# Patient Record
Sex: FEMALE | Race: OTHER | NOT HISPANIC OR LATINO | URBAN - METROPOLITAN AREA
[De-identification: names, ages, dates, MRNs, and addresses within clinical notes are randomized per-mention and may not be internally consistent; named-entity substitution may affect disease eponyms.]

---

## 2018-06-20 ENCOUNTER — EMERGENCY (EMERGENCY)
Facility: HOSPITAL | Age: 78
LOS: 1 days | Discharge: HOME | End: 2018-06-20
Attending: EMERGENCY MEDICINE
Payer: MEDICARE

## 2018-06-20 VITALS
TEMPERATURE: 98 F | OXYGEN SATURATION: 98 % | DIASTOLIC BLOOD PRESSURE: 81 MMHG | SYSTOLIC BLOOD PRESSURE: 153 MMHG | RESPIRATION RATE: 16 BRPM | HEART RATE: 60 BPM

## 2018-06-20 PROCEDURE — 99284 EMERGENCY DEPT VISIT MOD MDM: CPT | Mod: 25

## 2018-06-20 PROCEDURE — 73564 X-RAY EXAM KNEE 4 OR MORE: CPT

## 2018-06-20 PROCEDURE — 70450 CT HEAD/BRAIN W/O DYE: CPT | Mod: 26

## 2018-06-20 PROCEDURE — 72125 CT NECK SPINE W/O DYE: CPT

## 2018-06-20 PROCEDURE — 73564 X-RAY EXAM KNEE 4 OR MORE: CPT | Mod: 26,50

## 2018-06-20 PROCEDURE — 72125 CT NECK SPINE W/O DYE: CPT | Mod: 26

## 2018-06-20 PROCEDURE — 99284 EMERGENCY DEPT VISIT MOD MDM: CPT

## 2018-06-20 PROCEDURE — 70450 CT HEAD/BRAIN W/O DYE: CPT

## 2018-06-20 RX ORDER — ACETAMINOPHEN 500 MG
650 TABLET ORAL ONCE
Qty: 0 | Refills: 0 | Status: COMPLETED | OUTPATIENT
Start: 2018-06-20 | End: 2018-06-20

## 2018-06-20 RX ORDER — BACITRACIN ZINC 500 UNIT/G
1 OINTMENT IN PACKET (EA) TOPICAL ONCE
Qty: 0 | Refills: 0 | Status: COMPLETED | OUTPATIENT
Start: 2018-06-20 | End: 2018-06-20

## 2018-06-20 RX ADMIN — Medication 650 MILLIGRAM(S): at 20:49

## 2018-06-20 RX ADMIN — Medication 1 APPLICATION(S): at 20:49

## 2018-06-20 RX ADMIN — Medication 650 MILLIGRAM(S): at 19:09

## 2018-06-20 NOTE — ED PROVIDER NOTE - PHYSICAL EXAMINATION
Neck supple, full range of motion. No spinal/paraspinal tenderness to palpation. Bilateral shoulder, elbows, wrists, hips and ankles full range of motion without swelling or tenderness. Bilateral knees full range of motion with anterior knee tenderness, swelling and slight abrasions. R periorbital slight bruising without swelling or tenderness.

## 2018-06-20 NOTE — ED PROVIDER NOTE - OBJECTIVE STATEMENT
77 year-old female, history of HTN, HLD, DM, CAD, cardiac stents x 3 on Aspirin, presents for eval s/p fall today. While pushing shopping cart tripped and fell forward, landed on both knee and then onto right sided striking face on the ground. Denies LOC. Now c/o mild right sided facial pain and bilateral knee pain. Denies headache, visual changes, N/V, dizziness, or any other complaints.

## 2018-06-20 NOTE — ED PROVIDER NOTE - PROGRESS NOTE DETAILS
Feeling better. CT and xrays negative for acute findings. Bacitracin and ACE wraps applied to both knees. Will dc with PMD follow up in 2-3 days. Pt is well appearing walking with steady gait, stable for discharge and follow up without fail with medical doctor. I had a detailed discussion with the patient and/or guardian regarding the historical points, exam findings, and any diagnostic results supporting the discharge diagnosis. Pt educated on care and need for follow up. Strict return instructions and red flag signs and symptoms discussed with patient. Questions answered. Pt shows understanding of discharge information and agrees to follow.

## 2018-06-20 NOTE — ED PROVIDER NOTE - CARE PLAN
Principal Discharge DX:	Injury of head, initial encounter  Secondary Diagnosis:	Contusion of right knee, initial encounter

## 2018-06-20 NOTE — ED ADULT NURSE NOTE - OBJECTIVE STATEMENT
Pt AOx3, ambulatory, c/o BL knees pain and right sided facial pain, s/p trip/fall. PT denies LOC, syncope, dizziness, n/v., or HA. No confusion noted

## 2018-06-20 NOTE — ED PROVIDER NOTE - MEDICAL DECISION MAKING DETAILS
77 year-old female, presents for eval s/p mechanical fall earlier today. Well-appearing, ambulatory, no focal neuro deficits. Plan: xray, CT, Tylenol, re-assess.

## 2018-06-20 NOTE — ED PROVIDER NOTE - ATTENDING CONTRIBUTION TO CARE
s/o trip and fall landed on right sided striking face on sidewalk , no LOC    c/o right facial pain and right knee pain   knee with swelling and abrasions / FROM    CT and xrays negative

## 2018-06-21 PROBLEM — Z00.00 ENCOUNTER FOR PREVENTIVE HEALTH EXAMINATION: Status: ACTIVE | Noted: 2018-06-21

## 2022-06-13 ENCOUNTER — INPATIENT (INPATIENT)
Facility: HOSPITAL | Age: 82
LOS: 1 days | Discharge: HOME | DRG: 312 | End: 2022-06-15
Attending: INTERNAL MEDICINE | Admitting: INTERNAL MEDICINE
Payer: MEDICARE

## 2022-06-13 VITALS
OXYGEN SATURATION: 97 % | TEMPERATURE: 98 F | SYSTOLIC BLOOD PRESSURE: 165 MMHG | DIASTOLIC BLOOD PRESSURE: 72 MMHG | RESPIRATION RATE: 20 BRPM | HEIGHT: 64 IN | WEIGHT: 136.91 LBS | HEART RATE: 53 BPM

## 2022-06-13 DIAGNOSIS — R00.1 BRADYCARDIA, UNSPECIFIED: ICD-10-CM

## 2022-06-13 PROBLEM — E78.5 HYPERLIPIDEMIA, UNSPECIFIED: Chronic | Status: ACTIVE | Noted: 2018-06-20

## 2022-06-13 PROBLEM — I10 ESSENTIAL (PRIMARY) HYPERTENSION: Chronic | Status: ACTIVE | Noted: 2018-06-20

## 2022-06-13 PROBLEM — I25.10 ATHEROSCLEROTIC HEART DISEASE OF NATIVE CORONARY ARTERY WITHOUT ANGINA PECTORIS: Chronic | Status: ACTIVE | Noted: 2018-06-20

## 2022-06-13 PROBLEM — E11.9 TYPE 2 DIABETES MELLITUS WITHOUT COMPLICATIONS: Chronic | Status: ACTIVE | Noted: 2018-06-20

## 2022-06-13 LAB
ALBUMIN SERPL ELPH-MCNC: 4.1 G/DL — SIGNIFICANT CHANGE UP (ref 3.3–5)
ALP SERPL-CCNC: 55 U/L — SIGNIFICANT CHANGE UP (ref 40–120)
ALT FLD-CCNC: 9 U/L — LOW (ref 10–45)
ANION GAP SERPL CALC-SCNC: 12 MMOL/L — SIGNIFICANT CHANGE UP (ref 5–17)
AST SERPL-CCNC: 12 U/L — SIGNIFICANT CHANGE UP (ref 10–40)
BASOPHILS # BLD AUTO: 0.04 K/UL — SIGNIFICANT CHANGE UP (ref 0–0.2)
BASOPHILS NFR BLD AUTO: 0.7 % — SIGNIFICANT CHANGE UP (ref 0–2)
BILIRUB SERPL-MCNC: 0.4 MG/DL — SIGNIFICANT CHANGE UP (ref 0.2–1.2)
BUN SERPL-MCNC: 22 MG/DL — SIGNIFICANT CHANGE UP (ref 7–23)
CALCIUM SERPL-MCNC: 10.4 MG/DL — SIGNIFICANT CHANGE UP (ref 8.4–10.5)
CHLORIDE SERPL-SCNC: 104 MMOL/L — SIGNIFICANT CHANGE UP (ref 96–108)
CO2 SERPL-SCNC: 25 MMOL/L — SIGNIFICANT CHANGE UP (ref 22–31)
CREAT SERPL-MCNC: 0.81 MG/DL — SIGNIFICANT CHANGE UP (ref 0.5–1.3)
EGFR: 73 ML/MIN/1.73M2 — SIGNIFICANT CHANGE UP
EOSINOPHIL # BLD AUTO: 0.1 K/UL — SIGNIFICANT CHANGE UP (ref 0–0.5)
EOSINOPHIL NFR BLD AUTO: 1.8 % — SIGNIFICANT CHANGE UP (ref 0–6)
GLUCOSE BLDC GLUCOMTR-MCNC: 124 MG/DL — HIGH (ref 70–99)
GLUCOSE BLDC GLUCOMTR-MCNC: 179 MG/DL — HIGH (ref 70–99)
GLUCOSE SERPL-MCNC: 191 MG/DL — HIGH (ref 70–99)
HCT VFR BLD CALC: 39.1 % — SIGNIFICANT CHANGE UP (ref 34.5–45)
HGB BLD-MCNC: 12.5 G/DL — SIGNIFICANT CHANGE UP (ref 11.5–15.5)
IMM GRANULOCYTES NFR BLD AUTO: 0.2 % — SIGNIFICANT CHANGE UP (ref 0–1.5)
LYMPHOCYTES # BLD AUTO: 1.64 K/UL — SIGNIFICANT CHANGE UP (ref 1–3.3)
LYMPHOCYTES # BLD AUTO: 29.6 % — SIGNIFICANT CHANGE UP (ref 13–44)
MCHC RBC-ENTMCNC: 29.1 PG — SIGNIFICANT CHANGE UP (ref 27–34)
MCHC RBC-ENTMCNC: 32 GM/DL — SIGNIFICANT CHANGE UP (ref 32–36)
MCV RBC AUTO: 90.9 FL — SIGNIFICANT CHANGE UP (ref 80–100)
MONOCYTES # BLD AUTO: 0.66 K/UL — SIGNIFICANT CHANGE UP (ref 0–0.9)
MONOCYTES NFR BLD AUTO: 11.9 % — SIGNIFICANT CHANGE UP (ref 2–14)
NEUTROPHILS # BLD AUTO: 3.09 K/UL — SIGNIFICANT CHANGE UP (ref 1.8–7.4)
NEUTROPHILS NFR BLD AUTO: 55.8 % — SIGNIFICANT CHANGE UP (ref 43–77)
NRBC # BLD: 0 /100 WBCS — SIGNIFICANT CHANGE UP (ref 0–0)
PLATELET # BLD AUTO: 216 K/UL — SIGNIFICANT CHANGE UP (ref 150–400)
POTASSIUM SERPL-MCNC: 4.1 MMOL/L — SIGNIFICANT CHANGE UP (ref 3.5–5.3)
POTASSIUM SERPL-SCNC: 4.1 MMOL/L — SIGNIFICANT CHANGE UP (ref 3.5–5.3)
PROT SERPL-MCNC: 6.6 G/DL — SIGNIFICANT CHANGE UP (ref 6–8.3)
RBC # BLD: 4.3 M/UL — SIGNIFICANT CHANGE UP (ref 3.8–5.2)
RBC # FLD: 12.8 % — SIGNIFICANT CHANGE UP (ref 10.3–14.5)
SARS-COV-2 RNA SPEC QL NAA+PROBE: SIGNIFICANT CHANGE UP
SODIUM SERPL-SCNC: 141 MMOL/L — SIGNIFICANT CHANGE UP (ref 135–145)
WBC # BLD: 5.54 K/UL — SIGNIFICANT CHANGE UP (ref 3.8–10.5)
WBC # FLD AUTO: 5.54 K/UL — SIGNIFICANT CHANGE UP (ref 3.8–10.5)

## 2022-06-13 PROCEDURE — 71045 X-RAY EXAM CHEST 1 VIEW: CPT | Mod: 26

## 2022-06-13 PROCEDURE — 76377 3D RENDER W/INTRP POSTPROCES: CPT | Mod: 26

## 2022-06-13 PROCEDURE — 72170 X-RAY EXAM OF PELVIS: CPT | Mod: 26

## 2022-06-13 PROCEDURE — 72125 CT NECK SPINE W/O DYE: CPT | Mod: 26,MA

## 2022-06-13 PROCEDURE — 70450 CT HEAD/BRAIN W/O DYE: CPT | Mod: 26,MA

## 2022-06-13 PROCEDURE — 99285 EMERGENCY DEPT VISIT HI MDM: CPT | Mod: GC

## 2022-06-13 PROCEDURE — 70486 CT MAXILLOFACIAL W/O DYE: CPT | Mod: 26,MA

## 2022-06-13 RX ORDER — METFORMIN HYDROCHLORIDE 850 MG/1
1 TABLET ORAL
Qty: 0 | Refills: 0 | DISCHARGE

## 2022-06-13 RX ORDER — HEPARIN SODIUM 5000 [USP'U]/ML
5000 INJECTION INTRAVENOUS; SUBCUTANEOUS EVERY 8 HOURS
Refills: 0 | Status: DISCONTINUED | OUTPATIENT
Start: 2022-06-13 | End: 2022-06-15

## 2022-06-13 RX ORDER — LIPASE/PROTEASE/AMYLASE 16-48-48K
1 CAPSULE,DELAYED RELEASE (ENTERIC COATED) ORAL
Refills: 0 | Status: DISCONTINUED | OUTPATIENT
Start: 2022-06-13 | End: 2022-06-15

## 2022-06-13 RX ORDER — ROSUVASTATIN CALCIUM 5 MG/1
1 TABLET ORAL
Qty: 0 | Refills: 0 | DISCHARGE

## 2022-06-13 RX ORDER — FLUTICASONE PROPIONATE 50 MCG
1 SPRAY, SUSPENSION NASAL
Refills: 0 | Status: DISCONTINUED | OUTPATIENT
Start: 2022-06-13 | End: 2022-06-15

## 2022-06-13 RX ORDER — ASPIRIN/CALCIUM CARB/MAGNESIUM 324 MG
81 TABLET ORAL DAILY
Refills: 0 | Status: DISCONTINUED | OUTPATIENT
Start: 2022-06-13 | End: 2022-06-15

## 2022-06-13 RX ORDER — GLUCAGON INJECTION, SOLUTION 0.5 MG/.1ML
1 INJECTION, SOLUTION SUBCUTANEOUS ONCE
Refills: 0 | Status: DISCONTINUED | OUTPATIENT
Start: 2022-06-13 | End: 2022-06-15

## 2022-06-13 RX ORDER — CANAGLIFLOZIN 100 MG/1
1 TABLET, FILM COATED ORAL
Qty: 0 | Refills: 0 | DISCHARGE

## 2022-06-13 RX ORDER — SODIUM CHLORIDE 9 MG/ML
1000 INJECTION, SOLUTION INTRAVENOUS
Refills: 0 | Status: DISCONTINUED | OUTPATIENT
Start: 2022-06-13 | End: 2022-06-15

## 2022-06-13 RX ORDER — DEXLANSOPRAZOLE 30 MG/1
1 CAPSULE, DELAYED RELEASE ORAL
Qty: 0 | Refills: 0 | DISCHARGE

## 2022-06-13 RX ORDER — INSULIN LISPRO 100/ML
VIAL (ML) SUBCUTANEOUS
Refills: 0 | Status: DISCONTINUED | OUTPATIENT
Start: 2022-06-13 | End: 2022-06-15

## 2022-06-13 RX ORDER — MOMETASONE FUROATE 50 UG/1
1 SPRAY NASAL
Qty: 0 | Refills: 0 | DISCHARGE

## 2022-06-13 RX ORDER — INSULIN LISPRO 100/ML
VIAL (ML) SUBCUTANEOUS AT BEDTIME
Refills: 0 | Status: DISCONTINUED | OUTPATIENT
Start: 2022-06-13 | End: 2022-06-15

## 2022-06-13 RX ORDER — DEXTROSE 50 % IN WATER 50 %
25 SYRINGE (ML) INTRAVENOUS ONCE
Refills: 0 | Status: DISCONTINUED | OUTPATIENT
Start: 2022-06-13 | End: 2022-06-15

## 2022-06-13 RX ORDER — DEXTROSE 50 % IN WATER 50 %
12.5 SYRINGE (ML) INTRAVENOUS ONCE
Refills: 0 | Status: DISCONTINUED | OUTPATIENT
Start: 2022-06-13 | End: 2022-06-15

## 2022-06-13 RX ORDER — DONEPEZIL HYDROCHLORIDE 10 MG/1
5 TABLET, FILM COATED ORAL AT BEDTIME
Refills: 0 | Status: DISCONTINUED | OUTPATIENT
Start: 2022-06-13 | End: 2022-06-15

## 2022-06-13 RX ORDER — ESTROGENS, CONJUGATED 0.625 MG/G
0 CREAM WITH APPLICATOR VAGINAL
Qty: 0 | Refills: 0 | DISCHARGE

## 2022-06-13 RX ORDER — PANTOPRAZOLE SODIUM 20 MG/1
40 TABLET, DELAYED RELEASE ORAL
Refills: 0 | Status: DISCONTINUED | OUTPATIENT
Start: 2022-06-13 | End: 2022-06-15

## 2022-06-13 RX ORDER — ESCITALOPRAM OXALATE 10 MG/1
5 TABLET, FILM COATED ORAL DAILY
Refills: 0 | Status: DISCONTINUED | OUTPATIENT
Start: 2022-06-13 | End: 2022-06-15

## 2022-06-13 RX ORDER — GLIMEPIRIDE 1 MG
1 TABLET ORAL
Qty: 0 | Refills: 0 | DISCHARGE

## 2022-06-13 RX ORDER — DENOSUMAB 60 MG/ML
1 INJECTION SUBCUTANEOUS
Qty: 0 | Refills: 0 | DISCHARGE

## 2022-06-13 RX ORDER — ATORVASTATIN CALCIUM 80 MG/1
40 TABLET, FILM COATED ORAL AT BEDTIME
Refills: 0 | Status: DISCONTINUED | OUTPATIENT
Start: 2022-06-13 | End: 2022-06-15

## 2022-06-13 RX ORDER — DEXTROSE 50 % IN WATER 50 %
15 SYRINGE (ML) INTRAVENOUS ONCE
Refills: 0 | Status: DISCONTINUED | OUTPATIENT
Start: 2022-06-13 | End: 2022-06-15

## 2022-06-13 RX ORDER — LINACLOTIDE 145 UG/1
1 CAPSULE, GELATIN COATED ORAL
Qty: 0 | Refills: 0 | DISCHARGE

## 2022-06-13 RX ADMIN — Medication 1 CAPSULE(S): at 18:38

## 2022-06-13 RX ADMIN — DONEPEZIL HYDROCHLORIDE 5 MILLIGRAM(S): 10 TABLET, FILM COATED ORAL at 22:05

## 2022-06-13 RX ADMIN — ATORVASTATIN CALCIUM 40 MILLIGRAM(S): 80 TABLET, FILM COATED ORAL at 22:05

## 2022-06-13 RX ADMIN — Medication 81 MILLIGRAM(S): at 18:38

## 2022-06-13 RX ADMIN — PANTOPRAZOLE SODIUM 40 MILLIGRAM(S): 20 TABLET, DELAYED RELEASE ORAL at 18:38

## 2022-06-13 RX ADMIN — ESCITALOPRAM OXALATE 5 MILLIGRAM(S): 10 TABLET, FILM COATED ORAL at 18:38

## 2022-06-13 RX ADMIN — HEPARIN SODIUM 5000 UNIT(S): 5000 INJECTION INTRAVENOUS; SUBCUTANEOUS at 22:05

## 2022-06-13 RX ADMIN — Medication 1 SPRAY(S): at 18:39

## 2022-06-13 NOTE — PATIENT PROFILE ADULT - NSPROGENBLOODRESTRICT_GEN_A_NUR
Last refill: 04/13/21  Qty: 150  W/ 0 refills  Last ov: 09/17/20      Requested Prescriptions     Pending Prescriptions Disp Refills   • HYDROCODONE-ACETAMINOPHEN 5-325 MG Oral Tab [Pharmacy Med Name: Marige Baca 5-325-D] 75 tablet 0     Sig: TAKE 1/2 TA none

## 2022-06-13 NOTE — ED PROVIDER NOTE - PHYSICAL EXAMINATION
GENERAL: no acute distress, non-toxic appearing  HEAD: normocephalic, right eye swelling and ecchymosis  HEENT: PERRLA, EOMI, normal conjunctiva, no signs of trauma to eye  CARDIAC: bradycardic, regular rhythm, normal S1 and S2, no appreciable murmurs  PULM: clear to ascultation bilaterally  GI: abdomen nondistended, soft, nontender,  NEURO: alert and oriented x 3, normal speech, no gross neurologic deficit  MSK: no visible deformities, no midline spinal tenderness or step offs  SKIN: no visible rashes, dry, well-perfused  PSYCH: appropriate mood and affect

## 2022-06-13 NOTE — ED ADULT NURSE NOTE - NSIMPLEMENTINTERV_GEN_ALL_ED
Implemented All Fall Risk Interventions:  [place] to call system. Call bell, personal items and telephone within reach. Instruct patient to call for assistance. Room bathroom lighting operational. Non-slip footwear when patient is off stretcher. Physically safe environment: no spills, clutter or unnecessary equipment. Stretcher in lowest position, wheels locked, appropriate side rails in place. Provide visual cue, wrist band, yellow gown, etc. Monitor gait and stability. Monitor for mental status changes and reorient to person, place, and time. Review medications for side effects contributing to fall risk. Reinforce activity limits and safety measures with patient and family.

## 2022-06-13 NOTE — PATIENT PROFILE ADULT - FALL HARM RISK - HARM RISK INTERVENTIONS

## 2022-06-13 NOTE — ED PROVIDER NOTE - NSICDXPASTMEDICALHX_GEN_ALL_CORE_FT
PAST MEDICAL HISTORY:  CAD (coronary artery disease)     Diabetes mellitus type II     DM (diabetes mellitus)     Dyslipidemia     Gastric ulcer     HLD (hyperlipidemia)     HTN     HTN (hypertension)     Polymyalgia

## 2022-06-13 NOTE — ED PROVIDER NOTE - PROGRESS NOTE DETAILS
Katie Lopez MD PGY1: EKG with sinus bradycardia. HR 47. Likely falls from symptomatic bradycardia. Will call patient cardiologist to discuss medication regimen. Katie Lopez MD PGY1: Spoke with patient cardiologist Dr. Montano [phone]. He states patient currently supposed to be taking only coreg 3.125 and propanolol 5mg (essential tremor, started 1 week ago). The other medications on her list, metoprolol, amlodipine, losartan, she should not be taking. Agree with admission for cardiology w/u and observation off BP meds given HR 47 in ED. Patient observed walking in ED without syncopal episode. Patient denies lightheadedness or palpitations at this time. Katie Lopez MD PGY1: Spoke with patient cardiologist Dr. Montano [phone]. He states patient currently supposed to be taking only coreg 3.125 and propanolol 5mg (essential tremor, started 1 week ago), not metoprolol. Agree with admission for cardiology w/u and observation off BP meds given HR 47 in ED. Patient observed walking in ED without syncopal episode. Patient denies lightheadedness or palpitations at this time. Restart medications for BP on discharge as needed.

## 2022-06-13 NOTE — ED PROVIDER NOTE - ATTENDING CONTRIBUTION TO CARE
Attending MD Tavarez:  I personally have seen and examined this patient.  Resident note reviewed and agree on plan of care and except where noted.  Please see my MDM for further details.

## 2022-06-13 NOTE — ED PROVIDER NOTE - OBJECTIVE STATEMENT
Patient is a 81y F DM, HTN, CAD (s/p stents), "Arrythmia", presenting s/p fall at home. Patient got out of bed around midnight, fell to floor, hit head, now with right eye swelling. Patient family member at bedside reports patient has had frequent falls, last fell when she slipped in bathtub. Patient has recently been having palpitations, recently started on propanolol to take as needed. Patient walks with cane at baseline. Denies CP, SOB, LOC, confusion, vomiting.

## 2022-06-13 NOTE — ED ADULT NURSE NOTE - OBJECTIVE STATEMENT
1110 82 y/o f to er w/daughter w/c/o head inj/r eye ecchymosis s/p fall oob this early am. no n/v. c/o r eye blurry vison. no other c/o. able to move all extremities.ambulatory.

## 2022-06-13 NOTE — ED PROVIDER NOTE - IV ALTEPLASE DOOR HIDDEN
Date of Surgery Update:  Renee Rojas was seen and examined. History and physical has been reviewed. The patient has been examined.  There have been no significant clinical changes since the completion of the originally dated History and Physical.    Signed By: Mellias Carranza DDS     February 17, 2021 6:59 AM show

## 2022-06-13 NOTE — H&P ADULT - NSHPLABSRESULTS_GEN_ALL_CORE
LABS:                        12.5   5.54  )-----------( 216      ( 13 Jun 2022 12:31 )             39.1     06-13    141  |  104  |  22  ----------------------------<  191<H>  4.1   |  25  |  0.81    Ca    10.4      13 Jun 2022 12:31    TPro  6.6  /  Alb  4.1  /  TBili  0.4  /  DBili  x   /  AST  12  /  ALT  9<L>  /  AlkPhos  55  06-13              RADIOLOGY & ADDITIONAL TESTS:

## 2022-06-13 NOTE — ED PROVIDER NOTE - CLINICAL SUMMARY MEDICAL DECISION MAKING FREE TEXT BOX
Patient is a 81y F DM, HTN, CAD (s/p stents), "Arrythmia", presenting s/p fall at home. R/o ICH, orbital fracture. Will get CT head, neck, maxfac. will get labs, Ekg. Patient is a 81y F DM, HTN, CAD (s/p stents), "Arrythmia", presenting s/p fall at home. R/o ICH, orbital fracture. Will get CT head, neck, maxfac. will get labs, EKG. Patient is a 81y F DM, HTN, CAD (s/p stents), "Arrythmia", presenting s/p fall at home. R/o ICH, orbital fracture. Will get CT head, neck, maxfac. will get labs, EKG.    Attending MD Tavarez: 81y F DM, HTN, CAD (s/p stents), "Arrythmia", presenting s/p fall at home. Patient got out of bed around midnight, fell to floor, hit head, now with right eye swelling. Patient family member at bedside reports patient has had frequent falls, last fell when she slipped in bathtub.  On exam right eye swelling and ecchymosis.  No vision changes.  Neck with no midline tenderness to palpation, heart bradycardic, no significant edema.  Plan: Cardiac Monitor, EKG, Labs/cardiac enzymesC, CXR and admit to telemetry for further workup..

## 2022-06-13 NOTE — ED ADULT TRIAGE NOTE - CHIEF COMPLAINT QUOTE
daughter states pt fell durring the night right eye echymosis swollen shut pt denies loc daughter states pt has been having frequent falls pt c/o headache

## 2022-06-13 NOTE — H&P ADULT - HISTORY OF PRESENT ILLNESS
81y F DM, HTN, CAD (s/p stents), "Arrythmia", presenting s/p fall at home. Patient got out of bed around midnight, fell to floor, hit head, now with right eye swelling. Patient family member at bedside reports patient has had frequent falls, last fell when she slipped in bathtub. Patient has recently been having palpitations, recently started on propanolol to take as needed. Patient walks with cane at baseline. Denies CP, SOB, LOC, confusion, vomiting.

## 2022-06-14 DIAGNOSIS — E11.9 TYPE 2 DIABETES MELLITUS WITHOUT COMPLICATIONS: ICD-10-CM

## 2022-06-14 DIAGNOSIS — R55 SYNCOPE AND COLLAPSE: ICD-10-CM

## 2022-06-14 DIAGNOSIS — R00.1 BRADYCARDIA, UNSPECIFIED: ICD-10-CM

## 2022-06-14 DIAGNOSIS — I10 ESSENTIAL (PRIMARY) HYPERTENSION: ICD-10-CM

## 2022-06-14 DIAGNOSIS — I25.10 ATHEROSCLEROTIC HEART DISEASE OF NATIVE CORONARY ARTERY WITHOUT ANGINA PECTORIS: ICD-10-CM

## 2022-06-14 LAB
ANION GAP SERPL CALC-SCNC: 16 MMOL/L — SIGNIFICANT CHANGE UP (ref 5–17)
BUN SERPL-MCNC: 21 MG/DL — SIGNIFICANT CHANGE UP (ref 7–23)
CALCIUM SERPL-MCNC: 9.6 MG/DL — SIGNIFICANT CHANGE UP (ref 8.4–10.5)
CHLORIDE SERPL-SCNC: 106 MMOL/L — SIGNIFICANT CHANGE UP (ref 96–108)
CO2 SERPL-SCNC: 19 MMOL/L — LOW (ref 22–31)
CREAT SERPL-MCNC: 0.65 MG/DL — SIGNIFICANT CHANGE UP (ref 0.5–1.3)
EGFR: 88 ML/MIN/1.73M2 — SIGNIFICANT CHANGE UP
FOLATE SERPL-MCNC: 15.7 NG/ML — SIGNIFICANT CHANGE UP
GLUCOSE BLDC GLUCOMTR-MCNC: 117 MG/DL — HIGH (ref 70–99)
GLUCOSE BLDC GLUCOMTR-MCNC: 176 MG/DL — HIGH (ref 70–99)
GLUCOSE BLDC GLUCOMTR-MCNC: 186 MG/DL — HIGH (ref 70–99)
GLUCOSE BLDC GLUCOMTR-MCNC: 224 MG/DL — HIGH (ref 70–99)
GLUCOSE SERPL-MCNC: 108 MG/DL — HIGH (ref 70–99)
MAGNESIUM SERPL-MCNC: 2.1 MG/DL — SIGNIFICANT CHANGE UP (ref 1.6–2.6)
PHOSPHATE SERPL-MCNC: 3 MG/DL — SIGNIFICANT CHANGE UP (ref 2.5–4.5)
POTASSIUM SERPL-MCNC: 4.6 MMOL/L — SIGNIFICANT CHANGE UP (ref 3.5–5.3)
POTASSIUM SERPL-SCNC: 4.6 MMOL/L — SIGNIFICANT CHANGE UP (ref 3.5–5.3)
SODIUM SERPL-SCNC: 141 MMOL/L — SIGNIFICANT CHANGE UP (ref 135–145)
TSH SERPL-MCNC: 1.19 UIU/ML — SIGNIFICANT CHANGE UP (ref 0.27–4.2)
VIT B12 SERPL-MCNC: 421 PG/ML — SIGNIFICANT CHANGE UP (ref 232–1245)

## 2022-06-14 PROCEDURE — 93306 TTE W/DOPPLER COMPLETE: CPT | Mod: 26

## 2022-06-14 RX ORDER — ACETAMINOPHEN 500 MG
650 TABLET ORAL ONCE
Refills: 0 | Status: COMPLETED | OUTPATIENT
Start: 2022-06-14 | End: 2022-06-14

## 2022-06-14 RX ORDER — ACETAMINOPHEN 500 MG
650 TABLET ORAL ONCE
Refills: 0 | Status: DISCONTINUED | OUTPATIENT
Start: 2022-06-14 | End: 2022-06-15

## 2022-06-14 RX ADMIN — Medication 1 CAPSULE(S): at 10:14

## 2022-06-14 RX ADMIN — Medication 1 SPRAY(S): at 18:15

## 2022-06-14 RX ADMIN — PANTOPRAZOLE SODIUM 40 MILLIGRAM(S): 20 TABLET, DELAYED RELEASE ORAL at 05:56

## 2022-06-14 RX ADMIN — DONEPEZIL HYDROCHLORIDE 5 MILLIGRAM(S): 10 TABLET, FILM COATED ORAL at 21:15

## 2022-06-14 RX ADMIN — HEPARIN SODIUM 5000 UNIT(S): 5000 INJECTION INTRAVENOUS; SUBCUTANEOUS at 14:15

## 2022-06-14 RX ADMIN — Medication 2: at 12:57

## 2022-06-14 RX ADMIN — HEPARIN SODIUM 5000 UNIT(S): 5000 INJECTION INTRAVENOUS; SUBCUTANEOUS at 05:57

## 2022-06-14 RX ADMIN — Medication 81 MILLIGRAM(S): at 12:58

## 2022-06-14 RX ADMIN — Medication 1 SPRAY(S): at 05:56

## 2022-06-14 RX ADMIN — ESCITALOPRAM OXALATE 5 MILLIGRAM(S): 10 TABLET, FILM COATED ORAL at 12:58

## 2022-06-14 RX ADMIN — Medication 650 MILLIGRAM(S): at 10:46

## 2022-06-14 RX ADMIN — Medication 1 CAPSULE(S): at 12:58

## 2022-06-14 RX ADMIN — Medication 1 CAPSULE(S): at 18:24

## 2022-06-14 RX ADMIN — ATORVASTATIN CALCIUM 40 MILLIGRAM(S): 80 TABLET, FILM COATED ORAL at 21:15

## 2022-06-14 NOTE — PHYSICAL THERAPY INITIAL EVALUATION ADULT - ADDITIONAL COMMENTS
Pt lives in an apartment with elevator access. Daughter lives within home. Patient was independent with all ADLs and IADLs prior to admission.

## 2022-06-14 NOTE — CONSULT NOTE ADULT - SUBJECTIVE AND OBJECTIVE BOX
CHIEF COMPLAINT:    HISTORY OF PRESENT ILLNESS:  81y F DM, HTN, CAD (s/p stents), "Arrythmia", presenting s/p fall at home. Patient got out of bed around midnight, fell to floor, hit head, now with right eye swelling. Patient family member at bedside reports patient has had frequent falls, last fell when she slipped in bathtub. Patient has recently been having palpitations, recently started on propanolol to take as needed. Patient walks with cane at baseline. Denies CP, SOB, LOC, confusion, vomiting.      PAST MEDICAL & SURGICAL HISTORY:  Polymyalgia      Gastric ulcer      HTN      Diabetes mellitus type II      Dyslipidemia      HTN (hypertension)      CAD (coronary artery disease)      DM (diabetes mellitus)      HLD (hyperlipidemia)              MEDICATIONS:  aspirin enteric coated 81 milliGRAM(s) Oral daily  heparin   Injectable 5000 Unit(s) SubCutaneous every 8 hours        acetaminophen     Tablet .. 650 milliGRAM(s) Oral once PRN  donepezil 5 milliGRAM(s) Oral at bedtime  escitalopram 5 milliGRAM(s) Oral daily    pancrelipase  (CREON 12,000 Lipase Units) 1 Capsule(s) Oral three times a day with meals  pantoprazole    Tablet 40 milliGRAM(s) Oral before breakfast    atorvastatin 40 milliGRAM(s) Oral at bedtime  dextrose 50% Injectable 25 Gram(s) IV Push once  dextrose 50% Injectable 12.5 Gram(s) IV Push once  dextrose 50% Injectable 25 Gram(s) IV Push once  dextrose Oral Gel 15 Gram(s) Oral once PRN  glucagon  Injectable 1 milliGRAM(s) IntraMuscular once  insulin lispro (ADMELOG) corrective regimen sliding scale   SubCutaneous three times a day before meals  insulin lispro (ADMELOG) corrective regimen sliding scale   SubCutaneous at bedtime    dextrose 5%. 1000 milliLiter(s) IV Continuous <Continuous>  dextrose 5%. 1000 milliLiter(s) IV Continuous <Continuous>  fluticasone propionate 50 MICROgram(s)/spray Nasal Spray 1 Spray(s) Both Nostrils two times a day      FAMILY HISTORY:      SOCIAL HISTORY:    [ ] Non-smoker  [ ] Smoker  [ ] Alcohol    Allergies    penicillin (Angioedema)    Intolerances    	    REVIEW OF SYSTEMS:  CONSTITUTIONAL: No fever, weight loss,+ fatigue  EYES: No eye pain, visual disturbances, or discharge  ENMT:  No difficulty hearing, tinnitus, vertigo; No sinus or throat pain  NECK: No pain or stiffness  RESPIRATORY: No cough, wheezing, chills or hemoptysis; No Shortness of Breath  CARDIOVASCULAR: No chest pain, palpitations, passing out, dizziness, or leg swelling  GASTROINTESTINAL: No abdominal or epigastric pain. No nausea, vomiting, or hematemesis; No diarrhea or constipation. No melena or hematochezia.  GENITOURINARY: No dysuria, frequency, hematuria, or incontinence  NEUROLOGICAL: No headaches, memory loss, loss of strength, numbness, or tremors  SKIN: No itching, burning, rashes, or lesions   LYMPH Nodes: No enlarged glands  ENDOCRINE: No heat or cold intolerance; No hair loss  MUSCULOSKELETAL: No joint pain or swelling; No muscle, back, or extremity pain  PSYCHIATRIC: No depression, anxiety, mood swings, or difficulty sleeping  HEME/LYMPH: No easy bruising, or bleeding gums  ALLERY AND IMMUNOLOGIC: No hives or eczema	    [ ] All others negative	  [ ] Unable to obtain    PHYSICAL EXAM:  T(C): 36.7 (06-14-22 @ 04:41), Max: 36.9 (06-13-22 @ 15:09)  HR: 68 (06-14-22 @ 09:13) (50 - 68)  BP: 143/81 (06-14-22 @ 09:13) (140/60 - 180/68)  RR: 18 (06-14-22 @ 04:41) (18 - 20)  SpO2: 96% (06-14-22 @ 04:41) (95% - 100%)  Wt(kg): --  I&O's Summary      Appearance: NAD  HEENT:   Dry oral mucosa, PERRL, EOMI	  Lymphatic: No lymphadenopathy  Cardiovascular: Normal S1 S2, No JVD, No murmurs, No edema  Respiratory: Lungs clear to auscultation	  Psychiatry: A & O x 3, Mood & affect appropriate  Gastrointestinal:  Soft, Non-tender, + BS	  Skin: No rashes, No ecchymoses, No cyanosis	  Neurologic: Non-focal  Extremities: Normal range of motion, No clubbing, cyanosis or edema  Vascular: Peripheral pulses palpable 2+ bilaterally    TELEMETRY: SR Sinus richi 50s 	    ECG:  	  RADIOLOGY:  < from: CT Cervical Spine No Cont (06.13.22 @ 12:56) >    ACC: [id number] EXAM:  CT CERVICAL SPINE                        ACC: [id number] EXAM:  CT BRAIN                        ACC: [id number] EXAM:  CT MAXILLOFACIAL                        ACC: [id number] EXAM:  CT 3D RECONSTRUCT W SUE                          PROCEDURE DATE:  06/13/2022          INTERPRETATION:  CT head without IV contrast    CLINICAL INFORMATION: Intracranial hemorrhage.    TECHNIQUE: Contiguous axial 5 mm sections were obtained through the head.   Sagittal and coronal 2-D reformattedimages were also obtained.   This   scan was performed using automatic exposure control (radiation dose   reduction software) to obtain a diagnostic image quality scan with   patient dose as low as reasonably achievable.    FINDINGS:   No previous examinations are available for review.    The brain demonstrates no abnormal attenuation.   No acute cerebral   cortical infarct is seen.  No intracranial hemorrhage is found.  No mass   effect is found in the brain.    The ventricles, sulci and basal cisterns appear unremarkable.    The orbits show RIGHT periorbital soft tissue swelling.  The paranasal   sinuses are clear.  The nasal cavity appears intact.  The nasopharynx is   symmetric.  The central skull base, petrous temporal bones and calvarium   remain intact.    Small RIGHT frontal scalp hematoma is noted.          CT facial bones without IV contrast    CLINICAL INFORMATION: Facial pain and swelling. Fracture.    TECHNIQUE:  Contiguous axial 2 mm sections were reconstructed through the   facial bones using a single helical acquisition obtained at .5 mm   thickness.  Imaging post processing software was employed to generate   reformatted images in 2 mm sagittal and coronal imaging planes. 3-D   reformatted images were also obtained. This scan was performed using   automatic exposure control (radiation dose reduction software) to obtain   a diagnostic image quality scan with patient dose as low as reasonably   achievable.    FINDINGS:   No prior similar studies are available for review.    Facial bones are intact without fracture. RIGHT periorbital and buccal   soft tissue swelling is noted with small 2.2 hematoma in the RIGHT   lateral forehead.    The paranasal sinuses are clear.  No abnormal paranasal sinus fluid   collection is found.  No osseous erosion or expansion is present.    The nasal bones are intact without fracture.  The nasal septum shows a   reverse S-shaped deviation.  The ostiomeatal complexes appear normally   formed.    The orbits are unremarkable.  Preseptal structures are preserved.  The   globes are intact.  Intraconal and extraconal fat is preserved.  The   extraocular muscles remain symmetric.  The superior ophthalmic veins are   also symmetric.  Orbital rims remain intact.    The deep facial spaces are intact.   No radiopaque foreign body is seen.    The nasopharynx is symmetric.  The central skull base is intact.  The   visualized intracranial contents appear unremarkable.        CT cervical spine without IV contrast    CLINICAL INFORMATION: Fracture, trauma, neck pain.  Neck pain, spinal   stenosis, spondylosis. s/p fall, right facial swelling    TECHNIQUE:  Contiguous axial 2.0 mm sections were obtained through the   cervical spine using a single helical acquisition.  Additional 2 mm  sagittal and coronal reformatted reconstructions of the spine were   obtained.  These additional reformatted images were used to evaluate the   spine for alignment, vertebral fractures and the integrity of the the   posterior elements.   This scan was performed using automatic exposure   control (radiation dose reduction software) to obtain a diagnostic image   quality scan with patient dose as low as reasonably achievable.    FINDINGS:   No prior similar studies are available for review    Cervical vertebral body heights are maintained. No vertebral fracture is   seen. No destructive bone lesion is found.  Alignment is preserved.    Facet joints appear intact and aligned.    Cervical intervertebral disc spaces show moderate degenerative disc   disease and spondylosis at C3-4 through C6-7 with loss of disc height and   associated degenerative endplate changes. There is narrowing of the   BILATERAL C3-4, LEFT BILATERAL C5-C6 and LEFT C6-7 neural foramina due to   uncovertebral spurring and facet osteophytic hypertrophy. Mild   degenerative cord impingement seen at C5-6 and C6-7.    The skull base appears intact.  No neck mass is recognized.  Paraspinal   soft tissues appear intact. Visualized lymph nodes appear to be within   physiologic size limits.            IMPRESSION:    CT head: Unremarkable head CT.    CT facial bones:  Facial bones intact without fracture.    RIGHT   periorbital and buccal soft tissue swelling is noted with small 2.2   hematoma in the RIGHT lateral forehead.    CT cervical spine:   No vertebral fracture is recognized.  Moderate   degenerative disc disease and spondylosis at C3-4 through C6-7 with loss   of disc height and associated degenerative endplate changes. There is   narrowing of the BILATERAL C3-4, LEFT BILATERAL C5-C6 and LEFT C6-7   neural foramina due to uncovertebral spurring and facet osteophytic   hypertrophy. Mild degenerative cord impingement seen at C5-6 and C6-7.        --- End of Report ---            LEENA HINDS MD; Attending Radiologist  This document has been electronically signed. Jun 13 2022  1:25PM    < end of copied text >    OTHER: 	  	  LABS:	 	    CARDIAC MARKERS:                                  12.5   5.54  )-----------( 216      ( 13 Jun 2022 12:31 )             39.1     06-14    141  |  106  |  21  ----------------------------<  108<H>  4.6   |  19<L>  |  0.65    Ca    9.6      14 Jun 2022 07:00  Phos  3.0     06-14  Mg     2.1     06-14    TPro  6.6  /  Alb  4.1  /  TBili  0.4  /  DBili  x   /  AST  12  /  ALT  9<L>  /  AlkPhos  55  06-13    proBNP:   Lipid Profile:   HgA1c:   TSH:

## 2022-06-14 NOTE — PROGRESS NOTE ADULT - ASSESSMENT
81y F DM, HTN, CAD (s/p stents), "Arrythmia", presenting s/p fall at home. Patient got out of bed around midnight, fell to floor, hit head, now with right eye swelling. Patient family member at bedside reports patient has had frequent falls, last fell when she slipped in bathtub. Patient has recently been having palpitations, recently started on propanolol to take as needed. Patient walks with cane at baseline. Denies CP, SOB, LOC, confusion, vomiting.    syncope  - tele monitor  - echo done  - orthostatics    bradycardia 48 to 60 on tele  - stop coreg, propanolol  - also takes metoprolol prn  - tele monitor  - cardio saw pt    diabetes  - fq qid  - hgb a1c  - insulin ss  - diabetic diet    HTN  - monitor  - hold b blockers    dvt px  - sq heparin    dispo  - pt eval

## 2022-06-14 NOTE — PHYSICAL THERAPY INITIAL EVALUATION ADULT - PERTINENT HX OF CURRENT PROBLEM, REHAB EVAL
81y F DM, HTN, CAD (s/p stents), "Arrythmia", presenting s/p fall at home. Patient got out of bed around midnight, fell to floor, hit head, now with right eye swelling. Patient family member at bedside reports patient has had frequent falls, last fell when she slipped in bathtub

## 2022-06-14 NOTE — CONSULT NOTE ADULT - PROBLEM SELECTOR RECOMMENDATION 9
? bradycardia/heart block related   has been bradycardic on AV mynor blockers   agree to holding for now   check TTE   Monitor on Tele   orthostatics

## 2022-06-15 ENCOUNTER — TRANSCRIPTION ENCOUNTER (OUTPATIENT)
Age: 82
End: 2022-06-15

## 2022-06-15 VITALS
DIASTOLIC BLOOD PRESSURE: 76 MMHG | RESPIRATION RATE: 18 BRPM | SYSTOLIC BLOOD PRESSURE: 143 MMHG | TEMPERATURE: 99 F | OXYGEN SATURATION: 97 % | HEART RATE: 57 BPM

## 2022-06-15 LAB
GLUCOSE BLDC GLUCOMTR-MCNC: 141 MG/DL — HIGH (ref 70–99)
GLUCOSE BLDC GLUCOMTR-MCNC: 231 MG/DL — HIGH (ref 70–99)

## 2022-06-15 PROCEDURE — 36415 COLL VENOUS BLD VENIPUNCTURE: CPT

## 2022-06-15 PROCEDURE — 99285 EMERGENCY DEPT VISIT HI MDM: CPT | Mod: 25

## 2022-06-15 PROCEDURE — 84443 ASSAY THYROID STIM HORMONE: CPT

## 2022-06-15 PROCEDURE — 72125 CT NECK SPINE W/O DYE: CPT | Mod: MA

## 2022-06-15 PROCEDURE — U0003: CPT

## 2022-06-15 PROCEDURE — 85027 COMPLETE CBC AUTOMATED: CPT

## 2022-06-15 PROCEDURE — 82962 GLUCOSE BLOOD TEST: CPT

## 2022-06-15 PROCEDURE — 84100 ASSAY OF PHOSPHORUS: CPT

## 2022-06-15 PROCEDURE — 94640 AIRWAY INHALATION TREATMENT: CPT

## 2022-06-15 PROCEDURE — 76377 3D RENDER W/INTRP POSTPROCES: CPT

## 2022-06-15 PROCEDURE — 70486 CT MAXILLOFACIAL W/O DYE: CPT | Mod: MA

## 2022-06-15 PROCEDURE — 80048 BASIC METABOLIC PNL TOTAL CA: CPT

## 2022-06-15 PROCEDURE — 82607 VITAMIN B-12: CPT

## 2022-06-15 PROCEDURE — 97161 PT EVAL LOW COMPLEX 20 MIN: CPT

## 2022-06-15 PROCEDURE — 85025 COMPLETE CBC W/AUTO DIFF WBC: CPT

## 2022-06-15 PROCEDURE — 82746 ASSAY OF FOLIC ACID SERUM: CPT

## 2022-06-15 PROCEDURE — 93306 TTE W/DOPPLER COMPLETE: CPT

## 2022-06-15 PROCEDURE — 71045 X-RAY EXAM CHEST 1 VIEW: CPT

## 2022-06-15 PROCEDURE — 83735 ASSAY OF MAGNESIUM: CPT

## 2022-06-15 PROCEDURE — 80053 COMPREHEN METABOLIC PANEL: CPT

## 2022-06-15 PROCEDURE — U0005: CPT

## 2022-06-15 PROCEDURE — 70450 CT HEAD/BRAIN W/O DYE: CPT | Mod: MA

## 2022-06-15 PROCEDURE — 72170 X-RAY EXAM OF PELVIS: CPT

## 2022-06-15 RX ORDER — DONEPEZIL HYDROCHLORIDE 10 MG/1
1 TABLET, FILM COATED ORAL
Qty: 0 | Refills: 0 | DISCHARGE

## 2022-06-15 RX ORDER — ACETAMINOPHEN 500 MG
2 TABLET ORAL
Qty: 0 | Refills: 0 | DISCHARGE
Start: 2022-06-15

## 2022-06-15 RX ORDER — ESCITALOPRAM OXALATE 10 MG/1
1 TABLET, FILM COATED ORAL
Qty: 0 | Refills: 0 | DISCHARGE
Start: 2022-06-15

## 2022-06-15 RX ORDER — DONEPEZIL HYDROCHLORIDE 10 MG/1
1 TABLET, FILM COATED ORAL
Qty: 0 | Refills: 0 | DISCHARGE
Start: 2022-06-15

## 2022-06-15 RX ORDER — ASPIRIN/CALCIUM CARB/MAGNESIUM 324 MG
1 TABLET ORAL
Qty: 0 | Refills: 0 | DISCHARGE
Start: 2022-06-15

## 2022-06-15 RX ORDER — CARVEDILOL PHOSPHATE 80 MG/1
1 CAPSULE, EXTENDED RELEASE ORAL
Qty: 0 | Refills: 0 | DISCHARGE

## 2022-06-15 RX ORDER — ESCITALOPRAM OXALATE 10 MG/1
1 TABLET, FILM COATED ORAL
Qty: 0 | Refills: 0 | DISCHARGE

## 2022-06-15 RX ORDER — LIPASE/PROTEASE/AMYLASE 16-48-48K
1 CAPSULE,DELAYED RELEASE (ENTERIC COATED) ORAL
Qty: 0 | Refills: 0 | DISCHARGE
Start: 2022-06-15

## 2022-06-15 RX ORDER — LIPASE/PROTEASE/AMYLASE 16-48-48K
1 CAPSULE,DELAYED RELEASE (ENTERIC COATED) ORAL
Qty: 0 | Refills: 0 | DISCHARGE

## 2022-06-15 RX ORDER — LOSARTAN POTASSIUM 100 MG/1
25 TABLET, FILM COATED ORAL DAILY
Refills: 0 | Status: DISCONTINUED | OUTPATIENT
Start: 2022-06-15 | End: 2022-06-15

## 2022-06-15 RX ORDER — PROPRANOLOL HCL 160 MG
0.5 CAPSULE, EXTENDED RELEASE 24HR ORAL
Qty: 0 | Refills: 0 | DISCHARGE

## 2022-06-15 RX ORDER — NITROGLYCERIN 6.5 MG
1 CAPSULE, EXTENDED RELEASE ORAL
Qty: 0 | Refills: 0 | DISCHARGE

## 2022-06-15 RX ORDER — SODIUM CHLORIDE 9 MG/ML
1000 INJECTION INTRAMUSCULAR; INTRAVENOUS; SUBCUTANEOUS ONCE
Refills: 0 | Status: COMPLETED | OUTPATIENT
Start: 2022-06-15 | End: 2022-06-15

## 2022-06-15 RX ORDER — LOSARTAN POTASSIUM 100 MG/1
1 TABLET, FILM COATED ORAL
Qty: 30 | Refills: 0
Start: 2022-06-15 | End: 2022-07-14

## 2022-06-15 RX ORDER — ASPIRIN/CALCIUM CARB/MAGNESIUM 324 MG
1 TABLET ORAL
Qty: 0 | Refills: 0 | DISCHARGE

## 2022-06-15 RX ADMIN — PANTOPRAZOLE SODIUM 40 MILLIGRAM(S): 20 TABLET, DELAYED RELEASE ORAL at 05:13

## 2022-06-15 RX ADMIN — Medication 81 MILLIGRAM(S): at 12:33

## 2022-06-15 RX ADMIN — SODIUM CHLORIDE 1000 MILLILITER(S): 9 INJECTION INTRAMUSCULAR; INTRAVENOUS; SUBCUTANEOUS at 09:15

## 2022-06-15 RX ADMIN — Medication 1 CAPSULE(S): at 08:41

## 2022-06-15 RX ADMIN — HEPARIN SODIUM 5000 UNIT(S): 5000 INJECTION INTRAVENOUS; SUBCUTANEOUS at 05:13

## 2022-06-15 RX ADMIN — Medication 1 CAPSULE(S): at 12:33

## 2022-06-15 RX ADMIN — Medication 2: at 12:33

## 2022-06-15 RX ADMIN — Medication 1 SPRAY(S): at 05:13

## 2022-06-15 RX ADMIN — ESCITALOPRAM OXALATE 5 MILLIGRAM(S): 10 TABLET, FILM COATED ORAL at 12:33

## 2022-06-15 RX ADMIN — HEPARIN SODIUM 5000 UNIT(S): 5000 INJECTION INTRAVENOUS; SUBCUTANEOUS at 14:52

## 2022-06-15 NOTE — DISCHARGE NOTE NURSING/CASE MANAGEMENT/SOCIAL WORK - PATIENT PORTAL LINK FT
You can access the FollowMyHealth Patient Portal offered by [facility] by registering at the following website: [email or web address]. By joining [facility]’s FollowMyHealth portal, you will also be able to view your health information using other applications (apps) compatible with our system.

## 2022-06-15 NOTE — PROGRESS NOTE ADULT - ASSESSMENT
81y F DM, HTN, CAD (s/p stents), "Arrythmia", presenting s/p fall at home. Patient got out of bed around midnight, fell to floor, hit head, now with right eye swelling. Patient family member at bedside reports patient has had frequent falls, last fell when she slipped in bathtub. Patient has recently been having palpitations, recently started on propanolol to take as needed. Patient walks with cane at baseline. Denies CP, SOB, LOC, confusion, vomiting.    syncope  - tele monitor  - echo done  - orthostatics positive, but resolved    bradycardia 48 to 60 on tele  - stop coreg, propanolol  - also takes metoprolol prn  - tele monitor  - cardio saw pt    HTN  - c/w cozaar 25    diabetes  - fq qid  - hgb a1c  - insulin ss  - diabetic diet    HTN  - monitor  - hold b blockers    dvt px  - sq heparin    dispo  - pt eval  - d/c home today

## 2022-06-15 NOTE — PROGRESS NOTE ADULT - PROBLEM SELECTOR PLAN 1
Sinus bradycardia  Orthostatics + and supine hypertension   Was on Coreg Metoprolol and Propranolol at home  Would IV bolus once liter now and recheck orthostatics   If Orthostatics negative and she remains hypertensive, would start Losartan 25 mg daily   compression stockings

## 2022-06-15 NOTE — DISCHARGE NOTE PROVIDER - NSDCMRMEDTOKEN_GEN_ALL_CORE_FT
acetaminophen 325 mg oral tablet: 2 tab(s) orally once  aspirin 81 mg oral delayed release tablet: 1 tab(s) orally once a day  Dexilant 60 mg oral delayed release capsule: 1 cap(s) orally once a day  donepezil 5 mg oral tablet: 1 tab(s) orally once a day (at bedtime)  escitalopram 5 mg oral tablet: 1 tab(s) orally once a day  glimepiride 2 mg oral tablet: 1 tab(s) orally 3 times a day  Invokana 100 mg oral tablet: 1 tab(s) orally once a day  Linzess 145 mcg oral capsule: 1 cap(s) orally once a day, As Needed  losartan 25 mg oral tablet: 1 tab(s) orally once a day  metFORMIN 500 mg oral tablet: 1 tab(s) orally 2 times a day  mometasone 50 mcg/inh nasal spray: 1 spray(s) in each nostril once a day  pancrelipase 12,000 units-38,000 units-60,000 units oral delayed release capsule: 1 cap(s) orally 3 times a day (with meals)  Premarin 0.625 mg/g vaginal cream with applicator: Insert vaginal once a week  Prolia 60 mg/mL subcutaneous solution: 1 milliliter(s) subcutaneous every 6 months  Rollator: Rx given  rosuvastatin 10 mg oral tablet: 1 tab(s) orally once a day   acetaminophen 325 mg oral tablet: 2 tab(s) orally once  aspirin 81 mg oral delayed release tablet: 1 tab(s) orally once a day  Dexilant 60 mg oral delayed release capsule: 1 cap(s) orally once a day  donepezil 5 mg oral tablet: 1 tab(s) orally once a day (at bedtime)  escitalopram 5 mg oral tablet: 1 tab(s) orally once a day  glimepiride 2 mg oral tablet: 1 tab(s) orally 3 times a day  Invokana 100 mg oral tablet: 1 tab(s) orally once a day  Linzess 145 mcg oral capsule: 1 cap(s) orally once a day, As Needed  losartan 25 mg oral tablet: 1 tab(s) orally once a day  metFORMIN 500 mg oral tablet: 1 tab(s) orally 2 times a day  mometasone 50 mcg/inh nasal spray: 1 spray(s) in each nostril once a day  pancrelipase 12,000 units-38,000 units-60,000 units oral delayed release capsule: 1 cap(s) orally 3 times a day (with meals)  Premarin 0.625 mg/g vaginal cream with applicator: Insert vaginal once a week  Prolia 60 mg/mL subcutaneous solution: 1 milliliter(s) subcutaneous every 6 months  PT eval &amp; treat: PT eval &amp; treat 2-3/week  Rollator: Rx given  rosuvastatin 10 mg oral tablet: 1 tab(s) orally once a day

## 2022-06-15 NOTE — DISCHARGE NOTE PROVIDER - CARE PROVIDER_API CALL
Speedy Viveros (DO)  Cardiology; Internal Medicine  [address], [address]  [place] [address]  Phone: [phone]  Fax: [phone]  Follow Up Time: 1 week    Edwin Gamboa)  Medicine  [address] [place] [address], [address]  [place] [address]  Phone: [phone]  Fax: [phone]  Follow Up Time: 1 week

## 2022-06-15 NOTE — PROGRESS NOTE ADULT - SUBJECTIVE AND OBJECTIVE BOX
Subjective: Patient seen and examined. No new events except as noted.   resting comfortably   SB 45-50s on tele   feels ok   has been hypertensive    REVIEW OF SYSTEMS:    CONSTITUTIONAL: No weakness, fevers or chills  EYES/ENT: No visual changes;  No vertigo or throat pain   NECK: No pain or stiffness  RESPIRATORY: No cough, wheezing, hemoptysis; No shortness of breath  CARDIOVASCULAR: No chest pain or palpitations  GASTROINTESTINAL: No abdominal or epigastric pain. No nausea, vomiting, or hematemesis; No diarrhea or constipation. No melena or hematochezia.  GENITOURINARY: No dysuria, frequency or hematuria  NEUROLOGICAL: No numbness or weakness  SKIN: No itching, burning, rashes, or lesions   All other review of systems is negative unless indicated above.    MEDICATIONS:  MEDICATIONS  (STANDING):  acetaminophen     Tablet .. 650 milliGRAM(s) Oral once  aspirin enteric coated 81 milliGRAM(s) Oral daily  atorvastatin 40 milliGRAM(s) Oral at bedtime  dextrose 5%. 1000 milliLiter(s) (50 mL/Hr) IV Continuous <Continuous>  dextrose 5%. 1000 milliLiter(s) (100 mL/Hr) IV Continuous <Continuous>  dextrose 50% Injectable 25 Gram(s) IV Push once  dextrose 50% Injectable 12.5 Gram(s) IV Push once  dextrose 50% Injectable 25 Gram(s) IV Push once  donepezil 5 milliGRAM(s) Oral at bedtime  escitalopram 5 milliGRAM(s) Oral daily  fluticasone propionate 50 MICROgram(s)/spray Nasal Spray 1 Spray(s) Both Nostrils two times a day  glucagon  Injectable 1 milliGRAM(s) IntraMuscular once  heparin   Injectable 5000 Unit(s) SubCutaneous every 8 hours  insulin lispro (ADMELOG) corrective regimen sliding scale   SubCutaneous three times a day before meals  insulin lispro (ADMELOG) corrective regimen sliding scale   SubCutaneous at bedtime  losartan 25 milliGRAM(s) Oral daily  pancrelipase  (CREON 12,000 Lipase Units) 1 Capsule(s) Oral three times a day with meals  pantoprazole    Tablet 40 milliGRAM(s) Oral before breakfast      PHYSICAL EXAM:  T(C): 36.6 (06-15-22 @ 04:57), Max: 36.6 (06-14-22 @ 12:25)  HR: 55 (06-15-22 @ 04:57) (52 - 68)  BP: 153/75 (06-14-22 @ 20:37) (143/81 - 176/69)  RR: 18 (06-15-22 @ 04:57) (18 - 18)  SpO2: 95% (06-15-22 @ 04:57) (95% - 97%)  Wt(kg): --  I&O's Summary    14 Jun 2022 07:01  -  15 Richard 2022 07:00  --------------------------------------------------------  IN: 840 mL / OUT: 0 mL / NET: 840 mL          Appearance: NAD	  HEENT:   Normal oral mucosa, PERRL, EOMI R periorbital ecchymosis 	  Lymphatic: No lymphadenopathy , no edema  Cardiovascular: Normal S1 S2, No JVD, No murmurs , Peripheral pulses palpable 2+ bilaterally  Respiratory: Lungs clear to auscultation, normal effort 	  Gastrointestinal:  Soft, Non-tender, + BS	  Skin: No rashes, No ecchymoses, No cyanosis, warm to touch  Musculoskeletal: Normal range of motion, normal strength  Psychiatry:  Mood & affect appropriate  Ext: No edema      LABS:    CARDIAC MARKERS:                                12.5   5.54  )-----------( 216      ( 13 Jun 2022 12:31 )             39.1     06-14    141  |  106  |  21  ----------------------------<  108<H>  4.6   |  19<L>  |  0.65    Ca    9.6      14 Jun 2022 07:00  Phos  3.0     06-14  Mg     2.1     06-14    TPro  6.6  /  Alb  4.1  /  TBili  0.4  /  DBili  x   /  AST  12  /  ALT  9<L>  /  AlkPhos  55  06-13    proBNP:   Lipid Profile:   HgA1c:   TSH:             TELEMETRY: 	SB 45-50s     ECG:  	  RADIOLOGY:   DIAGNOSTIC TESTING:  [ ] Echocardiogram:  < from: Transthoracic Echocardiogram (06.14.22 @ 10:13) >    Patient name: PRINCE ROSAS  YOB: 1940   Age: 81 (F)   MR#: [id number]  Study Date: 6/14/2022  [facility]  Study quality: Technically good  Referring Physician: Edwin Gamboa MD  Blood Pressure: 169/84 mmHg  Height: 163 cm  Weight: 62 kg  BSA: 1.7 m2  ------------------------------------------------------------------------  PROCEDURE: Transthoracic echocardiogram with 2-D, M-Mode  and complete spectral and color flow Doppler.  INDICATION: Syncope and collapse (R55)  ------------------------------------------------------------------------  Dimensions:    Normal Values:  LA:     3.7    2.0 - 4.0 cm  Ao:     2.8    2.0 - 3.8 cm  SEPTUM: 1.0    0.6 - 1.2 cm  PWT:    1.0    0.6 - 1.1 cm  LVIDd:  4.2    3.0 - 5.6 cm  LVIDs:  3.0    1.8 - 4.0 cm  Derived variables:  LVMI: 82 g/m2  RWT: 0.47  Fractional short: 29 %  EF (Capellan Rule): 61 %Doppler Peak Velocity (m/sec):  AoV=1.5  ------------------------------------------------------------------------  Observations:  Mitral Valve: Normal mitral valve. Mild mitral  regurgitation.  Aortic Valve/Aorta: Normal trileaflet aortic valve. Peak  transaortic valve gradient equals 9 mm Hg. Mild aortic  regurgitation.  Peak left ventricular outflow tract  gradient equals 3 mm Hg, mean gradient is equal to 1 mm Hg,  LVOT velocity time integral equals 27 cm.  Aortic Root: 2.8 cm.  Left Atrium: Moderately dilated left atrium.  LA volume  index = 44 cc/m2.  Left Ventricle: Normal left ventricular systolic function.  No segmental wall motion abnormalities. Normal left  ventricular internal dimensions and wall thicknesses.  Reversal of the E-A  waves of the mitral inflow pattern is  consistent with diastolic LV dysfunction.  Right Heart: Normal right atrium. Normal right ventricular  size and function. Normal tricuspid valve. Minimal  tricuspid regurgitation. Normal pulmonic valve. Minimal  pulmonic regurgitation.  Pericardium/Pleura: Normal pericardium with no pericardial  effusion.  Hemodynamic: Estimated right atrial pressure is 8 mm Hg.  Color Doppler demonstrates no evidence of a patent foramen  ovale.  ------------------------------------------------------------------------  Conclusions:  1. Mild mitral regurgitation.  2. Mild aorticregurgitation.  3. Normal left ventricular internal dimensions and wall  thicknesses.  4. Normal left ventricular systolic function. No segmental  wall motion abnormalities.  5. Reversal of the E-A  waves of the mitral inflow pattern  is consistent with diastolic LV dysfunction.  6. Normal right ventricular size and function.  *** No previous Echo exam.  ------------------------------------------------------------------------  Confirmed on  6/14/2022 - 11:57:39 by Blayne Lindsey M.D.  ------------------------------------------------------------------------    < end of copied text >    [ ]  Catheterization:  [ ] Stress Test:    OTHER: 	          
DATE OF SERVICE: 06-14-22 @ 18:13    Patient is a 81y old  Female who presents with a chief complaint of s/p fall (14 Jun 2022 10:22)      SUBJECTIVE / OVERNIGHT EVENTS:  No chest pain. No shortness of breath. No complaints. No events overnight.     MEDICATIONS  (STANDING):  aspirin enteric coated 81 milliGRAM(s) Oral daily  atorvastatin 40 milliGRAM(s) Oral at bedtime  dextrose 5%. 1000 milliLiter(s) (50 mL/Hr) IV Continuous <Continuous>  dextrose 5%. 1000 milliLiter(s) (100 mL/Hr) IV Continuous <Continuous>  dextrose 50% Injectable 25 Gram(s) IV Push once  dextrose 50% Injectable 12.5 Gram(s) IV Push once  dextrose 50% Injectable 25 Gram(s) IV Push once  donepezil 5 milliGRAM(s) Oral at bedtime  escitalopram 5 milliGRAM(s) Oral daily  fluticasone propionate 50 MICROgram(s)/spray Nasal Spray 1 Spray(s) Both Nostrils two times a day  glucagon  Injectable 1 milliGRAM(s) IntraMuscular once  heparin   Injectable 5000 Unit(s) SubCutaneous every 8 hours  insulin lispro (ADMELOG) corrective regimen sliding scale   SubCutaneous three times a day before meals  insulin lispro (ADMELOG) corrective regimen sliding scale   SubCutaneous at bedtime  pancrelipase  (CREON 12,000 Lipase Units) 1 Capsule(s) Oral three times a day with meals  pantoprazole    Tablet 40 milliGRAM(s) Oral before breakfast    MEDICATIONS  (PRN):  dextrose Oral Gel 15 Gram(s) Oral once PRN Blood Glucose LESS THAN 70 milliGRAM(s)/deciliter      Vital Signs Last 24 Hrs  T(C): 36.6 (14 Jun 2022 12:25), Max: 36.7 (14 Jun 2022 04:41)  T(F): 97.8 (14 Jun 2022 12:25), Max: 98.1 (14 Jun 2022 04:41)  HR: 52 (14 Jun 2022 12:25) (52 - 68)  BP: 162/77 (14 Jun 2022 12:25) (140/60 - 169/84)  BP(mean): --  RR: 18 (14 Jun 2022 12:25) (18 - 20)  SpO2: 97% (14 Jun 2022 12:25) (96% - 98%)  CAPILLARY BLOOD GLUCOSE      POCT Blood Glucose.: 186 mg/dL (14 Jun 2022 17:26)  POCT Blood Glucose.: 224 mg/dL (14 Jun 2022 12:44)  POCT Blood Glucose.: 117 mg/dL (14 Jun 2022 08:54)  POCT Blood Glucose.: 179 mg/dL (13 Jun 2022 21:44)  POCT Blood Glucose.: 124 mg/dL (13 Jun 2022 18:17)    I&O's Summary    14 Jun 2022 07:01  -  14 Jun 2022 18:13  --------------------------------------------------------  IN: 360 mL / OUT: 0 mL / NET: 360 mL        PHYSICAL EXAM:  GENERAL: NAD, well-developed  HEAD:  Atraumatic, Normocephalic  EYES: EOMI, PERRLA, conjunctiva and sclera clear, ecchymosis around right eye  NECK: Supple, No JVD  CHEST/LUNG: Clear to auscultation bilaterally; No wheeze  HEART: Regular rate and rhythm; No murmurs, rubs, or gallops  ABDOMEN: Soft, Nontender, Nondistended; Bowel sounds present  EXTREMITIES:  2+ Peripheral Pulses, No clubbing, cyanosis, or edema  PSYCH: AAOx3  NEUROLOGY: non-focal  SKIN: No rashes or lesions    LABS:                        12.5   5.54  )-----------( 216      ( 13 Jun 2022 12:31 )             39.1     06-14    141  |  106  |  21  ----------------------------<  108<H>  4.6   |  19<L>  |  0.65    Ca    9.6      14 Jun 2022 07:00  Phos  3.0     06-14  Mg     2.1     06-14    TPro  6.6  /  Alb  4.1  /  TBili  0.4  /  DBili  x   /  AST  12  /  ALT  9<L>  /  AlkPhos  55  06-13        e< from: Transthoracic Echocardiogram (06.14.22 @ 10:13) >  PROCEDURE: Transthoracic echocardiogram with 2-D, M-Mode  and complete spectral and color flow Doppler.  INDICATION: Syncope and collapse (R55)  ------------------------------------------------------------------------  Dimensions:    Normal Values:  LA:     3.7    2.0 - 4.0 cm  Ao:     2.8    2.0 - 3.8 cm  SEPTUM: 1.0    0.6 - 1.2 cm  PWT:    1.0    0.6 - 1.1 cm  LVIDd:  4.2    3.0 - 5.6 cm  LVIDs:  3.0    1.8 - 4.0 cm  Derived variables:  LVMI: 82 g/m2  RWT: 0.47  Fractional short: 29 %  EF (Capellan Rule): 61 %Doppler Peak Velocity (m/sec):  AoV=1.5  ------------------------------------------------------------------------  Observations:  Mitral Valve: Normal mitral valve. Mild mitral  regurgitation.  Aortic Valve/Aorta: Normal trileaflet aortic valve. Peak  transaortic valve gradient equals 9 mm Hg. Mild aortic  regurgitation.  Peak left ventricular outflow tract  gradient equals 3 mm Hg, mean gradient is equal to 1 mm Hg,  LVOT velocity time integral equals 27 cm.  Aortic Root: 2.8 cm.  Left Atrium: Moderately dilated left atrium.  LA volume  index = 44 cc/m2.  Left Ventricle: Normal left ventricular systolic function.  No segmental wall motion abnormalities. Normal left  ventricular internal dimensions and wall thicknesses.  Reversal of the E-A  waves of the mitral inflow pattern is  consistent with diastolic LV dysfunction.  Right Heart: Normal right atrium. Normal right ventricular  size and function. Normal tricuspid valve. Minimal  tricuspid regurgitation. Normal pulmonic valve. Minimal  pulmonic regurgitation.  Pericardium/Pleura: Normal pericardium with no pericardial  effusion.  Hemodynamic: Estimated right atrial pressure is 8 mm Hg.  Color Doppler demonstrates no evidence of a patent foramen  ovale.  ------------------------------------------------------------------------  Conclusions:  1. Mild mitral regurgitation.  2. Mild aorticregurgitation.  3. Normal left ventricular internal dimensions and wall  thicknesses.  4. Normal left ventricular systolic function. No segmental  wall motion abnormalities.  5. Reversal of the E-A  waves of the mitral inflow pattern  is consistent with diastolic LV dysfunction.  6. Normal right ventricular size and function.  *** No previous Echo exam.    < end of copied text >        RADIOLOGY & ADDITIONAL TESTS:    Imaging Personally Reviewed:    Consultant(s) Notes Reviewed:      Care Discussed with Consultants/Other Providers:  
DATE OF SERVICE: 06-15-22 @ 21:00    Patient is a 81y old  Female who presents with a chief complaint of s/p fall (15 Richard 2022 15:18)      SUBJECTIVE / OVERNIGHT EVENTS:  No chest pain. No shortness of breath. No complaints. No events overnight. lost iv aceess. able to walk without assistance not dizzy. insisting to go home.    MEDICATIONS  (STANDING):  acetaminophen     Tablet .. 650 milliGRAM(s) Oral once  aspirin enteric coated 81 milliGRAM(s) Oral daily  atorvastatin 40 milliGRAM(s) Oral at bedtime  dextrose 5%. 1000 milliLiter(s) (50 mL/Hr) IV Continuous <Continuous>  dextrose 5%. 1000 milliLiter(s) (100 mL/Hr) IV Continuous <Continuous>  dextrose 50% Injectable 25 Gram(s) IV Push once  dextrose 50% Injectable 12.5 Gram(s) IV Push once  dextrose 50% Injectable 25 Gram(s) IV Push once  donepezil 5 milliGRAM(s) Oral at bedtime  escitalopram 5 milliGRAM(s) Oral daily  fluticasone propionate 50 MICROgram(s)/spray Nasal Spray 1 Spray(s) Both Nostrils two times a day  glucagon  Injectable 1 milliGRAM(s) IntraMuscular once  heparin   Injectable 5000 Unit(s) SubCutaneous every 8 hours  insulin lispro (ADMELOG) corrective regimen sliding scale   SubCutaneous three times a day before meals  insulin lispro (ADMELOG) corrective regimen sliding scale   SubCutaneous at bedtime  losartan 25 milliGRAM(s) Oral daily  pancrelipase  (CREON 12,000 Lipase Units) 1 Capsule(s) Oral three times a day with meals  pantoprazole    Tablet 40 milliGRAM(s) Oral before breakfast    MEDICATIONS  (PRN):  dextrose Oral Gel 15 Gram(s) Oral once PRN Blood Glucose LESS THAN 70 milliGRAM(s)/deciliter      Vital Signs Last 24 Hrs  T(C): 37.1 (15 Richard 2022 11:37), Max: 37.1 (15 Richard 2022 11:37)  T(F): 98.8 (15 Richard 2022 11:37), Max: 98.8 (15 Richard 2022 11:37)  HR: 57 (15 Richard 2022 11:37) (55 - 57)  BP: 143/76 (15 Richard 2022 11:37) (143/76 - 143/76)  BP(mean): --  RR: 18 (15 Richard 2022 11:37) (18 - 18)  SpO2: 97% (15 Richard 2022 11:37) (95% - 97%)  CAPILLARY BLOOD GLUCOSE      POCT Blood Glucose.: 231 mg/dL (15 Richard 2022 11:57)  POCT Blood Glucose.: 141 mg/dL (15 Richard 2022 08:49)  POCT Blood Glucose.: 176 mg/dL (14 Jun 2022 21:47)    I&O's Summary    14 Jun 2022 07:01  -  15 Richard 2022 07:00  --------------------------------------------------------  IN: 840 mL / OUT: 0 mL / NET: 840 mL    15 Richard 2022 07:01  -  15 Richard 2022 21:00  --------------------------------------------------------  IN: 480 mL / OUT: 0 mL / NET: 480 mL        PHYSICAL EXAM:  GENERAL: NAD, well-developed  HEAD:  Atraumatic, Normocephalic  EYES: EOMI, PERRLA, conjunctiva and sclera clear  NECK: Supple, No JVD  CHEST/LUNG: Clear to auscultation bilaterally; No wheeze  HEART: Regular rate and rhythm; No murmurs, rubs, or gallops  ABDOMEN: Soft, Nontender, Nondistended; Bowel sounds present  EXTREMITIES:  2+ Peripheral Pulses, No clubbing, cyanosis, or edema  PSYCH: AAOx3  NEUROLOGY: non-focal  SKIN: No rashes or lesions    LABS:    06-14    141  |  106  |  21  ----------------------------<  108<H>  4.6   |  19<L>  |  0.65    Ca    9.6      14 Jun 2022 07:00  Phos  3.0     06-14  Mg     2.1     06-14                RADIOLOGY & ADDITIONAL TESTS:    Imaging Personally Reviewed:    Consultant(s) Notes Reviewed:      Care Discussed with Consultants/Other Providers:

## 2022-06-15 NOTE — DISCHARGE NOTE PROVIDER - PROVIDER TOKENS
PROVIDER:[TOKEN:[[id number]],FOLLOWUP:[1 week]],PROVIDER:[TOKEN:[[id number]],FOLLOWUP:[1 week]]

## 2022-06-15 NOTE — DISCHARGE NOTE PROVIDER - NSDCCPCAREPLAN_GEN_ALL_CORE_FT
PRINCIPAL DISCHARGE DIAGNOSIS  Diagnosis: Sinus bradycardia  Assessment and Plan of Treatment: Coreg, propanolol stopped  evaluated by cardiology       PRINCIPAL DISCHARGE DIAGNOSIS  Diagnosis: Sinus bradycardia  Assessment and Plan of Treatment: Coreg, propanolol stopped  evaluated by cardiology  followup Dr. GLORIA Viveros 1-2 wks outpt; call for appt.      SECONDARY DISCHARGE DIAGNOSES  Diagnosis: Uncontrolled hypertension  Assessment and Plan of Treatment: low sodium diet  Rx Cozaar 25mg daily sent to pharmacy per Dr. Gamboa    Diagnosis: Uncontrolled hypertension  Assessment and Plan of Treatment:

## 2022-06-15 NOTE — DISCHARGE NOTE NURSING/CASE MANAGEMENT/SOCIAL WORK - NSDCPEFALRISK_GEN_ALL_CORE
For information on Fall & Injury Prevention, visit: [email or web address] OR  [email or web address] OR  https://www.cdc.gov/steadi/patient.html

## 2022-06-15 NOTE — DISCHARGE NOTE PROVIDER - HOSPITAL COURSE
round midnight, fell to floor, hit head, now with right eye swelling. Patient family member at bedside reports patient has had frequent falls, last fell when she slipped in bathtub. Patient has recently been having palpitations, recently started on propanolol to take as needed. Patient walks with cane at baseline. Denies CP, SOB, LOC, confusion, vomiting.    syncope  - tele monitor  - echo done  - orthostatics    bradycardia 48 to 60 on tele  - stop coreg, propanolol  - also takes metoprolol prn  - tele monitor  - cardio saw pt    diabetes  - fq qid  - hgb a1c  - insulin ss  - diabetic diet   round midnight, fell to floor, hit head, now with right eye swelling. Patient family member at bedside reports patient has had frequent falls, last fell when she slipped in bathtub. Patient has recently been having palpitations, recently started on propanolol to take as needed. Patient walks with cane at baseline. Denies CP, SOB, LOC, confusion, vomiting.    syncope  - tele monitor  - echo done  - orthostatics    bradycardia 48 to 60 on tele  - stop coreg, propanolol  - also takes metoprolol prn  - tele monitor  - cardio saw pt    diabetes  - fq qid  - hgb a1c  - insulin ss  - diabetic diet      HTN  - monitor  - hold b blockers    dvt px  - sq heparin    dispo  - pt eval  Cleared by Dr. Vasquez; begin Cozaar 25mg daily   hold BB

## 2024-05-03 ENCOUNTER — EMERGENCY (EMERGENCY)
Facility: HOSPITAL | Age: 84
LOS: 1 days | Discharge: HOME | End: 2024-05-03
Attending: EMERGENCY MEDICINE
Payer: MEDICARE

## 2024-05-03 VITALS
WEIGHT: 126.99 LBS | OXYGEN SATURATION: 98 % | RESPIRATION RATE: 18 BRPM | HEIGHT: 63 IN | TEMPERATURE: 98 F | SYSTOLIC BLOOD PRESSURE: 166 MMHG | DIASTOLIC BLOOD PRESSURE: 80 MMHG | HEART RATE: 62 BPM

## 2024-05-03 PROCEDURE — 99285 EMERGENCY DEPT VISIT HI MDM: CPT | Mod: GC

## 2024-05-03 PROCEDURE — 93010 ELECTROCARDIOGRAM REPORT: CPT

## 2024-05-03 NOTE — ED ADULT TRIAGE NOTE - PRO INTERPRETER NEED 2
Medication/Dose: Sildenafil 50MG  Patient last seen by PCP: 4/3/2020  Next office visit with PCP: None  Last Lab:7/5/2019    Above information requires contacting patient: No    Prescription does not require PDMP check    Sent to provider to approve or deny        
[place]

## 2024-05-03 NOTE — ED ADULT TRIAGE NOTE - CHIEF COMPLAINT QUOTE
complaining of high blood pressure today a/w headaches and 1 episode of vomiting,   200s systolic at home.   Denies blurry vision, chest pain, shortness of breath.

## 2024-05-04 VITALS
OXYGEN SATURATION: 98 % | SYSTOLIC BLOOD PRESSURE: 162 MMHG | RESPIRATION RATE: 16 BRPM | HEART RATE: 85 BPM | DIASTOLIC BLOOD PRESSURE: 66 MMHG

## 2024-05-04 PROBLEM — I10 ESSENTIAL (PRIMARY) HYPERTENSION: Chronic | Status: ACTIVE | Noted: 2022-06-14

## 2024-05-04 LAB
ALBUMIN SERPL ELPH-MCNC: 4.6 G/DL — SIGNIFICANT CHANGE UP (ref 3.3–5)
ALP SERPL-CCNC: 56 U/L — SIGNIFICANT CHANGE UP (ref 40–120)
ALT FLD-CCNC: 12 U/L — SIGNIFICANT CHANGE UP (ref 10–45)
ANION GAP SERPL CALC-SCNC: 17 MMOL/L — SIGNIFICANT CHANGE UP (ref 5–17)
AST SERPL-CCNC: 24 U/L — SIGNIFICANT CHANGE UP (ref 10–40)
BASOPHILS # BLD AUTO: 0.04 K/UL — SIGNIFICANT CHANGE UP (ref 0–0.2)
BASOPHILS NFR BLD AUTO: 0.6 % — SIGNIFICANT CHANGE UP (ref 0–2)
BILIRUB SERPL-MCNC: 0.4 MG/DL — SIGNIFICANT CHANGE UP (ref 0.2–1.2)
BUN SERPL-MCNC: 14 MG/DL — SIGNIFICANT CHANGE UP (ref 7–23)
CALCIUM SERPL-MCNC: 10.4 MG/DL — SIGNIFICANT CHANGE UP (ref 8.4–10.5)
CHLORIDE SERPL-SCNC: 101 MMOL/L — SIGNIFICANT CHANGE UP (ref 96–108)
CO2 SERPL-SCNC: 21 MMOL/L — LOW (ref 22–31)
CREAT SERPL-MCNC: 0.7 MG/DL — SIGNIFICANT CHANGE UP (ref 0.5–1.3)
EGFR: 86 ML/MIN/1.73M2 — SIGNIFICANT CHANGE UP
EOSINOPHIL # BLD AUTO: 0.1 K/UL — SIGNIFICANT CHANGE UP (ref 0–0.5)
EOSINOPHIL NFR BLD AUTO: 1.4 % — SIGNIFICANT CHANGE UP (ref 0–6)
GLUCOSE SERPL-MCNC: 192 MG/DL — HIGH (ref 70–99)
HCT VFR BLD CALC: 43.2 % — SIGNIFICANT CHANGE UP (ref 34.5–45)
HGB BLD-MCNC: 14 G/DL — SIGNIFICANT CHANGE UP (ref 11.5–15.5)
IMM GRANULOCYTES NFR BLD AUTO: 0.3 % — SIGNIFICANT CHANGE UP (ref 0–0.9)
LYMPHOCYTES # BLD AUTO: 1.48 K/UL — SIGNIFICANT CHANGE UP (ref 1–3.3)
LYMPHOCYTES # BLD AUTO: 20.6 % — SIGNIFICANT CHANGE UP (ref 13–44)
MAGNESIUM SERPL-MCNC: 2.3 MG/DL — SIGNIFICANT CHANGE UP (ref 1.6–2.6)
MCHC RBC-ENTMCNC: 29.3 PG — SIGNIFICANT CHANGE UP (ref 27–34)
MCHC RBC-ENTMCNC: 32.4 GM/DL — SIGNIFICANT CHANGE UP (ref 32–36)
MCV RBC AUTO: 90.4 FL — SIGNIFICANT CHANGE UP (ref 80–100)
MONOCYTES # BLD AUTO: 0.57 K/UL — SIGNIFICANT CHANGE UP (ref 0–0.9)
MONOCYTES NFR BLD AUTO: 7.9 % — SIGNIFICANT CHANGE UP (ref 2–14)
NEUTROPHILS # BLD AUTO: 4.97 K/UL — SIGNIFICANT CHANGE UP (ref 1.8–7.4)
NEUTROPHILS NFR BLD AUTO: 69.2 % — SIGNIFICANT CHANGE UP (ref 43–77)
NRBC # BLD: 0 /100 WBCS — SIGNIFICANT CHANGE UP (ref 0–0)
NT-PROBNP SERPL-SCNC: 609 PG/ML — HIGH (ref 0–300)
PLATELET # BLD AUTO: 256 K/UL — SIGNIFICANT CHANGE UP (ref 150–400)
POTASSIUM SERPL-MCNC: 4.7 MMOL/L — SIGNIFICANT CHANGE UP (ref 3.5–5.3)
POTASSIUM SERPL-SCNC: 4.7 MMOL/L — SIGNIFICANT CHANGE UP (ref 3.5–5.3)
PROT SERPL-MCNC: 7.7 G/DL — SIGNIFICANT CHANGE UP (ref 6–8.3)
RBC # BLD: 4.78 M/UL — SIGNIFICANT CHANGE UP (ref 3.8–5.2)
RBC # FLD: 12.8 % — SIGNIFICANT CHANGE UP (ref 10.3–14.5)
SODIUM SERPL-SCNC: 139 MMOL/L — SIGNIFICANT CHANGE UP (ref 135–145)
TROPONIN T, HIGH SENSITIVITY RESULT: 15 NG/L — SIGNIFICANT CHANGE UP (ref 0–51)
WBC # BLD: 7.18 K/UL — SIGNIFICANT CHANGE UP (ref 3.8–10.5)
WBC # FLD AUTO: 7.18 K/UL — SIGNIFICANT CHANGE UP (ref 3.8–10.5)

## 2024-05-04 PROCEDURE — 84295 ASSAY OF SERUM SODIUM: CPT

## 2024-05-04 PROCEDURE — 83735 ASSAY OF MAGNESIUM: CPT

## 2024-05-04 PROCEDURE — 82803 BLOOD GASES ANY COMBINATION: CPT

## 2024-05-04 PROCEDURE — 36000 PLACE NEEDLE IN VEIN: CPT

## 2024-05-04 PROCEDURE — 85018 HEMOGLOBIN: CPT

## 2024-05-04 PROCEDURE — 99283 EMERGENCY DEPT VISIT LOW MDM: CPT | Mod: 25

## 2024-05-04 PROCEDURE — 80053 COMPREHEN METABOLIC PANEL: CPT

## 2024-05-04 PROCEDURE — 85025 COMPLETE CBC W/AUTO DIFF WBC: CPT

## 2024-05-04 PROCEDURE — 85014 HEMATOCRIT: CPT

## 2024-05-04 PROCEDURE — 82330 ASSAY OF CALCIUM: CPT

## 2024-05-04 PROCEDURE — 82947 ASSAY GLUCOSE BLOOD QUANT: CPT

## 2024-05-04 PROCEDURE — 82435 ASSAY OF BLOOD CHLORIDE: CPT

## 2024-05-04 PROCEDURE — 83880 ASSAY OF NATRIURETIC PEPTIDE: CPT

## 2024-05-04 PROCEDURE — 84132 ASSAY OF SERUM POTASSIUM: CPT

## 2024-05-04 PROCEDURE — 93005 ELECTROCARDIOGRAM TRACING: CPT

## 2024-05-04 PROCEDURE — 84484 ASSAY OF TROPONIN QUANT: CPT

## 2024-05-04 PROCEDURE — 83605 ASSAY OF LACTIC ACID: CPT

## 2024-05-04 NOTE — ED PROVIDER NOTE - PATIENT PORTAL LINK FT
You can access the FollowMyHealth Patient Portal offered by [facility] by registering at the following website: [email or web address] By joining [employer]’s FollowMyHealth portal, you will also be able to view your health information using other applications (apps) compatible with our system.

## 2024-05-04 NOTE — ED PROVIDER NOTE - PROGRESS NOTE DETAILS
The patient is leaving against the team's medical advice. We have informed the patient about the risks, benefits, and alternatives to the evaluation and treatment of their condition. The patient reports understanding of these issues and has the capacity and insight to make important medical decisions. Clear return precautions were discussed, the patient was urged to seek urgent follow-up care, with appropriate referrals made as needed. The necessity to follow up with their primary medical doctor/clinic/specialist was provided and encouraged to be done within 2-3 days, if not sooner. The patient understands that this decision to go against medical advice can be changed at any time and the patient can return for re-evaluation and further treatment with any changes in condition or for any concerns at all. The patient understands that they can and will receive healthcare services regardless of the ability to pay and that the hospital can and will work with them on any financial issue, and that this is not a reason to decline care. I have made the best effort allowed, however, the patient still wishes to leave. The patient was given the opportunity to ask questions and have them answered in full. The patient is with capacity and insight into the situation, treatment, risks, benefits, alternative therapies, and they understand that they can ask any further questions if needed. The patient understands all the reasons to return to the Emergency Department, including but not limited to all that was discussed about their care today, or any concerns at all. The patient reports understanding of above with capacity and insight.

## 2024-05-04 NOTE — ED PROVIDER NOTE - NSFOLLOWUPINSTRUCTIONS_ED_ALL_ED_FT
You were seen in the ED for hypertension. Your lab work was unremarkable. The results are included in your discharge paperwork.    If you have any headache, dizziness, change in vision, chest pain, shortness of breath, decreased urine output immediately return to ED.    Follow up with your Primary Doctor within one week.

## 2024-05-04 NOTE — ED PROVIDER NOTE - ATTENDING CONTRIBUTION TO CARE
Patient with elevation of blood pressure and nausea/vomiting around 5pm yesterday accompained by her daughter, free translation services offered and patient prefers to use her daughter. Patient without chest pain, sweating, or lightheadedness during the episode. Patient reports checking her blood pressure during the episode and was noted to have blood pressure to 200s per daughter. No radiation from chest to back/abdomen to back. No ripping/tearing pain. patient with holter monitor in place  ncat, non-tachycardic, non-tachypneic, trachea midline, no rash/vesicles/petechiae, lungs clear to auscultation bilaterally, equal breath sounds bilaterally, no gross deformity of extremities, no asymmetry, no edema  Attending Emergency Medicine Physician- Speedy Quintero MD, FACEP: In this physician's medical judgement based on clinical history and physical exam the patient's signs and symptoms lead to differential diagnoses which includes but is not limited to: hypertensive urgency, cva, acs, nstemi  Historical features, symptoms, and clinical exam not consistent with: sepsis  Labs were ordered and independently reviewed by me.  EKG was ordered 56bpm, no stemi, pr 160, qtc 501  Imaging was ordered and patient declines ct head and cxr  History assisted by daughter at bedside  Will follow up on labs, therapeutics, imaging, reassess and disposition as clinically indicated.  *The above represents an initial assessment/impression. Please refer to my progress notes below for potential changes in patient clinical course*  Escalation to admission/observation was considered.    Portions of this note have been documented using voice recognition software. As a result, errors may occur in the transcription process. Effort has been made to correct all grammatical and transcription error, but some may have been missed which may produce sporadic inaccurate transcription or nonsensical phrases.    Admission/observation is offered to the patient, however the patient declines.  They would like to go home and follow up with their private physician. see progress note above

## 2024-05-04 NOTE — ED PROVIDER NOTE - OBJECTIVE STATEMENT
83 year old female with hx of DM and CAD presenting for evaluation of nausea and headache that occurred earlier in the day. She was not feeling that great when her family decided to check her blood pressure. She had an episode of emesis, non-bloody. Following her BP read was 200 systolic and patient has continued nausea. No fever, chills, chest pain, palpitations, or SOB. She had further episodes of vomiting with nausea. No diarrhea or constipation without urinary symptoms. Recent medication change from sotalol and propranolol. Otherwise, no other acute complaints.

## 2024-05-04 NOTE — ED ADULT NURSE NOTE - OBJECTIVE STATEMENT
83y F Jimmy x4 came in for hypertension. Patient is mainly Turkmen speaking, daughter is present at bedside and is willing to translate. Patient reports that from 1700 to 2300 yesterday she was very hypertension. Patient also states that during this time she had two episodes of vomiting. Patient reports she also had a headache during this time. Patient reports her highest systolic BP was in the 200s. Denies fever, chills, dizziness, diarrhea, blurry vision, dysuria, weakness, numbness, tingling, SOB, and chest pain. Daughter is present at bedside. Call bell within reach, bed in lowest position.

## 2024-05-04 NOTE — ED ADULT NURSE NOTE - CHPI ED NUR DURATION
Patient calling. Requesting a refill of Tramadol. Has about 8 left.    Pharmacy of choice attached, [facility] in [place].   yesterday

## 2024-05-04 NOTE — ED PROVIDER NOTE - NSICDXPASTMEDICALHX_GEN_ALL_CORE_FT
PAST MEDICAL HISTORY:  CAD (coronary artery disease)     Diabetes mellitus type II     DM (diabetes mellitus)     Dyslipidemia     Gastric ulcer     HLD (hyperlipidemia)     HTN     HTN (hypertension)     HTN (hypertension)     Polymyalgia

## 2024-11-26 NOTE — ED ADULT NURSE NOTE - BOWEL SOUNDS LLQ
Called and spoke to patient in regards to blood work. Pt states will get done before appointment with [facility]. Reminded patient of appointment date and time.  
present